# Patient Record
Sex: FEMALE | Race: WHITE | NOT HISPANIC OR LATINO | Employment: FULL TIME | URBAN - METROPOLITAN AREA
[De-identification: names, ages, dates, MRNs, and addresses within clinical notes are randomized per-mention and may not be internally consistent; named-entity substitution may affect disease eponyms.]

---

## 2017-08-25 ENCOUNTER — HOSPITAL ENCOUNTER (OUTPATIENT)
Dept: RADIOLOGY | Facility: HOSPITAL | Age: 32
Discharge: HOME | End: 2017-08-25
Payer: COMMERCIAL

## 2017-08-25 ENCOUNTER — ALLSCRIPTS OFFICE VISIT (OUTPATIENT)
Dept: OTHER | Facility: OTHER | Age: 32
End: 2017-08-25

## 2017-08-25 ENCOUNTER — LAB REQUISITION (OUTPATIENT)
Dept: LAB | Facility: HOSPITAL | Age: 32
End: 2017-08-25
Payer: COMMERCIAL

## 2017-08-25 DIAGNOSIS — N92.6 IRREGULAR MENSTRUATION: ICD-10-CM

## 2017-08-25 DIAGNOSIS — N94.6 DYSMENORRHEA: ICD-10-CM

## 2017-08-25 DIAGNOSIS — Z11.51 ENCOUNTER FOR SCREENING FOR HUMAN PAPILLOMAVIRUS (HPV): ICD-10-CM

## 2017-08-25 DIAGNOSIS — Z12.4 ENCOUNTER FOR SCREENING FOR MALIGNANT NEOPLASM OF CERVIX: ICD-10-CM

## 2017-08-25 LAB — HCG, QUALITATIVE (HISTORICAL): NEGATIVE

## 2017-08-25 PROCEDURE — 87591 N.GONORRHOEAE DNA AMP PROB: CPT | Performed by: NURSE PRACTITIONER

## 2017-08-25 PROCEDURE — 76856 US EXAM PELVIC COMPLETE: CPT

## 2017-08-25 PROCEDURE — 76830 TRANSVAGINAL US NON-OB: CPT

## 2017-08-25 PROCEDURE — 87624 HPV HI-RISK TYP POOLED RSLT: CPT | Performed by: NURSE PRACTITIONER

## 2017-08-25 PROCEDURE — G0145 SCR C/V CYTO,THINLAYER,RESCR: HCPCS | Performed by: NURSE PRACTITIONER

## 2017-08-25 PROCEDURE — 87491 CHLMYD TRACH DNA AMP PROBE: CPT | Performed by: NURSE PRACTITIONER

## 2017-08-28 ENCOUNTER — APPOINTMENT (OUTPATIENT)
Dept: LAB | Facility: HOSPITAL | Age: 32
End: 2017-08-28
Payer: COMMERCIAL

## 2017-08-28 ENCOUNTER — TRANSCRIBE ORDERS (OUTPATIENT)
Dept: LAB | Facility: HOSPITAL | Age: 32
End: 2017-08-28

## 2017-08-28 DIAGNOSIS — N92.6 IRREGULAR MENSTRUATION: ICD-10-CM

## 2017-08-28 DIAGNOSIS — N94.6 DYSMENORRHEA: ICD-10-CM

## 2017-08-28 LAB
CHLAMYDIA DNA CVX QL NAA+PROBE: NORMAL
ERYTHROCYTE [DISTWIDTH] IN BLOOD BY AUTOMATED COUNT: 12.9 % (ref 11.6–15.1)
HCT VFR BLD AUTO: 41.1 % (ref 34.8–46.1)
HGB BLD-MCNC: 13.7 G/DL (ref 11.5–15.4)
MCH RBC QN AUTO: 32 PG (ref 26.8–34.3)
MCHC RBC AUTO-ENTMCNC: 33.3 G/DL (ref 31.4–37.4)
MCV RBC AUTO: 96 FL (ref 82–98)
N GONORRHOEA DNA GENITAL QL NAA+PROBE: NORMAL
PLATELET # BLD AUTO: 260 THOUSANDS/UL (ref 149–390)
PMV BLD AUTO: 11.3 FL (ref 8.9–12.7)
RBC # BLD AUTO: 4.28 MILLION/UL (ref 3.81–5.12)
TSH SERPL DL<=0.05 MIU/L-ACNC: 3.27 UIU/ML (ref 0.36–3.74)
WBC # BLD AUTO: 9.15 THOUSAND/UL (ref 4.31–10.16)

## 2017-08-28 PROCEDURE — 84443 ASSAY THYROID STIM HORMONE: CPT

## 2017-08-28 PROCEDURE — 36415 COLL VENOUS BLD VENIPUNCTURE: CPT

## 2017-08-28 PROCEDURE — 85027 COMPLETE CBC AUTOMATED: CPT

## 2017-08-29 LAB — HPV RRNA GENITAL QL NAA+PROBE: NORMAL

## 2017-08-30 LAB
LAB AP GYN PRIMARY INTERPRETATION: NORMAL
Lab: NORMAL

## 2017-08-31 ENCOUNTER — GENERIC CONVERSION - ENCOUNTER (OUTPATIENT)
Dept: OTHER | Facility: OTHER | Age: 32
End: 2017-08-31

## 2017-09-01 ENCOUNTER — GENERIC CONVERSION - ENCOUNTER (OUTPATIENT)
Dept: OTHER | Facility: OTHER | Age: 32
End: 2017-09-01

## 2017-09-15 ENCOUNTER — GENERIC CONVERSION - ENCOUNTER (OUTPATIENT)
Dept: OTHER | Facility: OTHER | Age: 32
End: 2017-09-15

## 2017-12-27 ENCOUNTER — GENERIC CONVERSION - ENCOUNTER (OUTPATIENT)
Dept: OTHER | Facility: OTHER | Age: 32
End: 2017-12-27

## 2018-01-13 NOTE — MISCELLANEOUS
Message   Recorded as Task   Date: 08/31/2017 03:54 PM, Created By: Kwaku Hobbs   Task Name: Call Back   Assigned To: [facility],Team   Regarding Patient: Va German, Status: In Progress   Comment:    Stacey Son - 31 Aug 2017 3:54 PM     TASK CREATED  Caller: Self; Results Inquiry; [phone] (Home); [phone] (Work)  pt would like her bloodwork & us results done Amelie sl on 8/25, pls call [phone]   [facility] [place] - 31 Aug 2017 4:19 PM     TASK IN PROGRESS   ElviaBrooklyn suarez - 31 Aug 2017 4:23 PM     TASK EDITED  Spoke with pt - advised results needs to be verified first  Once they are she will be notified of results  Active Problems    1  Breast cyst (610 0) (N60 09)   2  Cervical cancer screening (V76 2) (Z12 4)   3  Dysmenorrhea (625 3) (N94 6)   4  Encounter for gynecological examination without abnormal finding (V72 31) (Z01 419)   5  Irregular menses (626 4) (N92 6)   6  Screening for HPV (human papillomavirus) (V73 81) (Z11 51)   7  Skin lesion (709 9) (L98 9)    Current Meds   1  Biotin CAPS Recorded   2  ZyrTEC Allergy 10 MG Oral Capsule Recorded    Allergies    1  Aleve TABS   2  Lyrica CAPS   3  Macrobid CAPS   4   Penicillins    Signatures   Electronically signed by : Mendel Patel, ; Aug 31 2017  4:23PM EST                       (Author)

## 2018-01-14 VITALS
DIASTOLIC BLOOD PRESSURE: 60 MMHG | SYSTOLIC BLOOD PRESSURE: 114 MMHG | BODY MASS INDEX: 28.77 KG/M2 | HEIGHT: 66 IN | WEIGHT: 179 LBS

## 2018-01-17 NOTE — MISCELLANEOUS
Message   Recorded as Task   Date: 09/01/2017 03:56 PM, Created By: Letty Salmon   Task Name: Result Follow Up   Assigned To: [facility],Team   Regarding Patient: Quinten Polo, Status: In Progress   Blake Clayton - 01 Sep 2017 3:56 PM     TASK CREATED  Work up performed for irreg menses   US and labs are WNL; please advise that pap is also WNL   I would advise active management to regulate cycles given the frequency of her bleeding   We discussed options at her office visit   She may f/u any time     Thanks   Brooklyn Gan - 01 Sep 2017 3:57 PM     TASK IN PROGRESS   Brooklyn Gan - 01 Sep 2017 4:03 PM     TASK EDITED  [facility]INT IS AWARE OF RESULTS  MADE AN APPOINTMENT TO DISCUSS ACTIVE MANAGEMENT  Active Problems    1  Breast cyst (610 0) (N60 09)   2  Cervical cancer screening (V76 2) (Z12 4)   3  Dysmenorrhea (625 3) (N94 6)   4  Encounter for gynecological examination without abnormal finding (V72 31) (Z01 419)   5  Irregular menses (626 4) (N92 6)   6  Screening for HPV (human papillomavirus) (V73 81) (Z11 51)   7  Skin lesion (709 9) (L98 9)    Current Meds   1  Biotin CAPS Recorded   2  ZyrTEC Allergy 10 MG Oral Capsule Recorded    Allergies    1  Aleve TABS   2  Lyrica CAPS   3  Macrobid CAPS   4   Penicillins    Signatures   Electronically signed by : Lidia Pacheco, ; Sep  1 2017  4:03PM EST                       (Author)

## 2018-01-22 VITALS
SYSTOLIC BLOOD PRESSURE: 130 MMHG | DIASTOLIC BLOOD PRESSURE: 82 MMHG | WEIGHT: 176 LBS | BODY MASS INDEX: 28.28 KG/M2 | HEIGHT: 66 IN

## 2018-01-23 NOTE — MISCELLANEOUS
Message   Recorded as Task   Date: 12/27/2017 10:26 AM, Created By: Soraida López   Task Name: Care Coordination   Assigned To: [facility],Team   Regarding Patient: Jamia Barcenas, Status: In Progress   Comment:    Maricruz Alcantar - 27 Dec 2017 10:26 AM     TASK CREATED  Caller: Self; Care Coordination; [phone] (Home); [phone] (Work)  pt call would like her blood work results and us to be fax over to her primary dr [facility] fax # [phone] and there # is [phone] pt's phone # [phone]  Brooklyn Gan - 27 Dec 2017 10:37 AM     TASK IN PROGRESS   Brooklyn Gan - 27 Dec 2017 10:46 AM     TASK EDITED  [facility] for pt to cb  Need to know when b/w was done  ext: [phone]   Brooklyn Gan - 27 Dec 2017 10:55 AM     TASK EDITED  Faxed labs to pt PCP  Active Problems    1  Breast cyst (610 0) (N60 09)   2  Dysmenorrhea (625 3) (N94 6)   3  Irregular menses (626 4) (N92 6)   4  Skin lesion (709 9) (L98 9)    Current Meds   1  Biotin CAPS Recorded   2  ZyrTEC Allergy 10 MG Oral Capsule Recorded    Allergies    1  Aleve TABS   2  Lyrica CAPS   3  Macrobid CAPS   4   Penicillins    Signatures   Electronically signed by : Rashad Charles, ; Dec 27 2017 10:55AM EST                       (Author)

## 2018-02-15 ENCOUNTER — OFFICE VISIT (OUTPATIENT)
Dept: OBGYN CLINIC | Facility: CLINIC | Age: 33
End: 2018-02-15
Payer: COMMERCIAL

## 2018-02-15 VITALS
DIASTOLIC BLOOD PRESSURE: 70 MMHG | BODY MASS INDEX: 27.64 KG/M2 | HEIGHT: 66 IN | SYSTOLIC BLOOD PRESSURE: 118 MMHG | WEIGHT: 172 LBS

## 2018-02-15 DIAGNOSIS — N94.6 DYSMENORRHEA: Primary | ICD-10-CM

## 2018-02-15 DIAGNOSIS — N92.0 MENORRHAGIA WITH REGULAR CYCLE: ICD-10-CM

## 2018-02-15 PROCEDURE — 99214 OFFICE O/P EST MOD 30 MIN: CPT | Performed by: NURSE PRACTITIONER

## 2018-02-15 RX ORDER — BIOTIN 10000 MCG
CAPSULE ORAL
COMMUNITY

## 2018-02-15 RX ORDER — DAPSONE 75 MG/G
GEL TOPICAL
COMMUNITY
Start: 2016-08-04 | End: 2021-08-18

## 2018-02-15 RX ORDER — EMOLLIENT COMBINATION NO.43
CREAM (GRAM) TOPICAL
Refills: 1 | COMMUNITY
Start: 2018-01-16 | End: 2019-11-19

## 2018-02-15 RX ORDER — EMOLLIENT COMBINATION NO.43
CREAM (GRAM) TOPICAL
COMMUNITY
Start: 2016-08-04

## 2018-02-15 NOTE — ASSESSMENT & PLAN NOTE
This patient presents to discuss longstanding h/o dysmenorrhea and menorrhagia  Work up was recently WNL  She has been counseled on management in the past and has difficulty deciding how she wishes to proceed  She is tearful with this discussion again today - emotional support was provided  We reviewed NSAIDS, hormonal meds or surgical consult to discuss exploratory lap for r/o endometriosis  She will consider  Written info given for Mirena IUD

## 2018-02-15 NOTE — PROGRESS NOTES
Assessment/Plan:    Dysmenorrhea  This patient presents to discuss longstanding h/o dysmenorrhea and menorrhagia  Work up was recently WNL  She has been counseled on management in the past and has difficulty deciding how she wishes to proceed  She is tearful with this discussion again today - emotional support was provided  We reviewed NSAIDS, hormonal meds or surgical consult to discuss exploratory lap for r/o endometriosis  She will consider  Written info given for Mirena IUD  Diagnoses and all orders for this visit:    Dysmenorrhea    Menorrhagia with regular cycle    Other orders  -     Dapsone (ACZONE) 7 5 % GEL; PEA SIZE TO FACE , NECK AND CHEST EVERY NIGHT AT BEDTIME  -     Antiseborrheic Products, Misc  (PROMISEB) CREA; APPLY TO FACE TWICE A DAY AS NEEDED  -     Biotin 10 MG CAPS; Take by mouth  -     Antiseborrheic Products, Misc  (PROMISEB) CREA; TO FACE 2 TIMES A DAY AS NEEDED  -     Cetirizine HCl (ZYRTEC ALLERGY) 10 MG CAPS; Take by mouth          Subjective:      Patient ID: Emerick Cogan is a 28 y o  female  This patient presents to discuss painful and heavy menses  Cycles are currently reg  Spots for about 7d then has 1 day of heavy bleeding  She has cramping and low back pain on that one day of heavy bleeding  Midol is somewhat effective  She has been recently dx'd with hemorrhoids on colonoscopy - expectant management was recommended by colorectal  She bleeds more rectally when she is menstruating  She feels very stressed about making a decision re: management options  Tearful with this discussion  The following portions of the patient's history were reviewed and updated as appropriate: allergies, current medications, past family history, past medical history, past social history, past surgical history and problem list     Review of Systems   Constitutional: Negative  HENT: Negative  Eyes: Negative  Respiratory: Negative  Cardiovascular: Negative  Gastrointestinal: Positive for blood in stool  Endocrine: Negative  Genitourinary: Positive for menstrual problem  Negative for dysuria, frequency, pelvic pain and vaginal discharge  Musculoskeletal: Negative  Skin: Negative  Allergic/Immunologic: Negative  Neurological: Negative  Hematological: Negative  Psychiatric/Behavioral: Negative  Objective:    Vitals:    02/15/18 1555   BP: 118/70        Physical Exam   Constitutional: She is oriented to person, place, and time  She appears well-developed and well-nourished  HENT:   Head: Normocephalic  Eyes: Pupils are equal, round, and reactive to light  Neck: Normal range of motion  Pulmonary/Chest: Effort normal    Neurological: She is alert and oriented to person, place, and time  Psychiatric: She has a normal mood and affect   Her behavior is normal  Judgment and thought content normal

## 2019-09-10 ENCOUNTER — ANNUAL EXAM (OUTPATIENT)
Dept: OBGYN CLINIC | Facility: CLINIC | Age: 34
End: 2019-09-10
Payer: COMMERCIAL

## 2019-09-10 VITALS — BODY MASS INDEX: 27.57 KG/M2 | DIASTOLIC BLOOD PRESSURE: 62 MMHG | SYSTOLIC BLOOD PRESSURE: 108 MMHG | WEIGHT: 170.8 LBS

## 2019-09-10 DIAGNOSIS — Z01.419 ENCOUNTER FOR GYNECOLOGICAL EXAMINATION (GENERAL) (ROUTINE) WITHOUT ABNORMAL FINDINGS: Primary | ICD-10-CM

## 2019-09-10 DIAGNOSIS — N92.6 IRREGULAR MENSES: ICD-10-CM

## 2019-09-10 DIAGNOSIS — K64.8 INTERNAL HEMORRHOIDS: ICD-10-CM

## 2019-09-10 PROCEDURE — S0612 ANNUAL GYNECOLOGICAL EXAMINA: HCPCS | Performed by: NURSE PRACTITIONER

## 2019-09-10 RX ORDER — MOMETASONE FUROATE 1 MG/G
CREAM TOPICAL
COMMUNITY
Start: 2018-10-23 | End: 2020-09-14

## 2019-09-10 NOTE — ASSESSMENT & PLAN NOTE
Ongoing unpredictable cycles  2017 work up was benign and the patient declines medical management  Offered preop with MD/DO to discuss benefits of lap for r/o endometriosis and the patient declines  She is aware she may schedule this any time; she requests [place] or [place]  F/u PRN if medical or surgical management is desired, or when actively TTC

## 2019-09-10 NOTE — ASSESSMENT & PLAN NOTE
Normal findings on routine gyn exam  Advised monthly SBE, annual CBE and baseline screening mammo at age 36  Reviewed ASCCP guidelines and recommended pap with cotesting q3 yrs for this low risk patient; this is noted to be up to date  STI testing was offered and the patient declines; she reports low risk  The patient declines contra counseling as she is not currently sexually active  Diet/activity recommendations reviewed  RTO in one year or sooner PRN

## 2019-09-10 NOTE — ASSESSMENT & PLAN NOTE
Dx'd on colonoscopy  Findings otherwise WNL  She has been counseled on constipation management with cyclical changes

## 2019-09-12 NOTE — PROGRESS NOTES
Assessment/Plan:    Irregular menses  Ongoing unpredictable cycles  2017 work up was benign and the patient declines medical management  Offered preop with MD/DO to discuss benefits of lap for r/o endometriosis and the patient declines  She is aware she may schedule this any time; she requests [place] or [place]  F/u PRN if medical or surgical management is desired, or when actively TTC  Encounter for gynecological examination (general) (routine) without abnormal findings  Normal findings on routine gyn exam  Advised monthly SBE, annual CBE and baseline screening mammo at age 36  Reviewed ASCCP guidelines and recommended pap with cotesting q3 yrs for this low risk patient; this is noted to be up to date  STI testing was offered and the patient declines; she reports low risk  The patient declines contra counseling as she is not currently sexually active  Diet/activity recommendations reviewed  RTO in one year or sooner PRN  Internal hemorrhoids  Dx'd on colonoscopy  Findings otherwise WNL  She has been counseled on constipation management with cyclical changes  Diagnoses and all orders for this visit:    Encounter for gynecological examination (general) (routine) without abnormal findings    Irregular menses    Internal hemorrhoids    Other orders  -     mometasone (ELOCON) 0 1 % cream; Apply once daily to affected areas of skin          Subjective:      Patient ID: Miley Garcia is a 29 y o  female  This patient presents for routine annual gyn exam    Medically stable  Colonoscopy with internal hemorrhoids    Irreg periods have continued intermittently  Work up was benign and she elects to continue expectant management  Not sexually active  Relationship is good  Thinking about engagement soon   She denies acute gyn complaints  She denies pelvic pain, breast concerns, abnormal discharge, bowel/bladder dysfunction, depression/anxiety  Monogamous  She denies STI concerns             The following portions of the patient's history were reviewed and updated as appropriate: allergies, current medications, past family history, past medical history, past social history, past surgical history and problem list     Review of Systems   Constitutional: Negative  Respiratory: Negative  Cardiovascular: Negative  Gastrointestinal: Negative  Genitourinary: Positive for menstrual problem  Negative for dysuria, frequency, pelvic pain, vaginal bleeding and vaginal discharge  Musculoskeletal: Negative  Skin: Negative  Neurological: Negative  Psychiatric/Behavioral: Negative  Objective:      /62 (BP Location: Right arm, Patient Position: Sitting, Cuff Size: Standard)   Wt 77 5 kg (170 lb 12 8 oz)   LMP 08/31/2019 (Exact Date)   BMI 27 57 kg/m²          Physical Exam   Constitutional: She is oriented to person, place, and time  She appears well-developed and well-nourished  HENT:   Head: Normocephalic and atraumatic  Eyes: Pupils are equal, round, and reactive to light  EOM are normal    Neck: Normal range of motion  Neck supple  No thyromegaly present  Cardiovascular: Normal rate, regular rhythm and normal heart sounds  Pulmonary/Chest: Effort normal and breath sounds normal  No respiratory distress  She has no wheezes  She has no rales  She exhibits no mass, no tenderness and no deformity  Right breast exhibits no inverted nipple, no mass, no nipple discharge, no skin change and no tenderness  Left breast exhibits no inverted nipple, no mass, no nipple discharge, no skin change and no tenderness  No breast tenderness or discharge  Breasts are symmetrical    Abdominal: Soft  She exhibits no distension and no mass  There is no splenomegaly or hepatomegaly  There is no tenderness  There is no rebound and no guarding  Genitourinary: Rectum normal, vagina normal and uterus normal  No breast tenderness or discharge  No labial fusion   There is no rash, tenderness, lesion or injury on the right labia  There is no rash, tenderness, lesion or injury on the left labia  Cervix exhibits no motion tenderness, no discharge and no friability  Right adnexum displays no mass, no tenderness and no fullness  Left adnexum displays no mass, no tenderness and no fullness  No erythema, tenderness or bleeding in the vagina  No foreign body in the vagina  No vaginal discharge found  Musculoskeletal: Normal range of motion  Lymphadenopathy:     She has no cervical adenopathy  She has no axillary adenopathy  Neurological: She is alert and oriented to person, place, and time  No cranial nerve deficit  Skin: Skin is warm and dry  No rash noted  No cyanosis  Nails show no clubbing  Psychiatric: She has a normal mood and affect   Her speech is normal and behavior is normal  Judgment and thought content normal  Cognition and memory are normal

## 2019-11-19 PROBLEM — J30.1 CHRONIC SEASONAL ALLERGIC RHINITIS DUE TO POLLEN: Status: ACTIVE | Noted: 2019-11-19

## 2019-11-19 PROBLEM — N94.6 DYSMENORRHEA: Status: ACTIVE | Noted: 2017-08-25

## 2019-11-19 PROBLEM — J45.990 EXERCISE INDUCED BRONCHOSPASM: Status: ACTIVE | Noted: 2019-11-19

## 2019-11-19 PROBLEM — L20.84 INTRINSIC ATOPIC DERMATITIS: Status: ACTIVE | Noted: 2019-11-19

## 2019-11-19 PROBLEM — L30.9 MILD ECZEMA: Status: ACTIVE | Noted: 2019-07-23

## 2019-11-19 PROBLEM — J30.9 ALLERGIC RHINITIS DUE TO ALLERGEN: Status: ACTIVE | Noted: 2019-07-23

## 2019-11-19 PROBLEM — H10.13 ALLERGIC CONJUNCTIVITIS OF BOTH EYES: Status: ACTIVE | Noted: 2019-11-19

## 2020-10-15 ENCOUNTER — ANNUAL EXAM (OUTPATIENT)
Dept: OBGYN CLINIC | Facility: CLINIC | Age: 35
End: 2020-10-15
Payer: COMMERCIAL

## 2020-10-15 VITALS
DIASTOLIC BLOOD PRESSURE: 64 MMHG | HEIGHT: 66 IN | SYSTOLIC BLOOD PRESSURE: 122 MMHG | TEMPERATURE: 97.9 F | WEIGHT: 171.8 LBS | BODY MASS INDEX: 27.61 KG/M2

## 2020-10-15 DIAGNOSIS — N92.6 IRREGULAR MENSES: ICD-10-CM

## 2020-10-15 DIAGNOSIS — N76.0 BV (BACTERIAL VAGINOSIS): ICD-10-CM

## 2020-10-15 DIAGNOSIS — Z11.51 SCREENING FOR HUMAN PAPILLOMAVIRUS (HPV): ICD-10-CM

## 2020-10-15 DIAGNOSIS — Z01.419 ENCOUNTER FOR GYNECOLOGICAL EXAMINATION (GENERAL) (ROUTINE) WITHOUT ABNORMAL FINDINGS: Primary | ICD-10-CM

## 2020-10-15 DIAGNOSIS — B96.89 BV (BACTERIAL VAGINOSIS): ICD-10-CM

## 2020-10-15 DIAGNOSIS — N94.6 DYSMENORRHEA: ICD-10-CM

## 2020-10-15 PROCEDURE — S0612 ANNUAL GYNECOLOGICAL EXAMINA: HCPCS | Performed by: OBSTETRICS & GYNECOLOGY

## 2020-10-15 RX ORDER — METRONIDAZOLE 500 MG/1
500 TABLET ORAL EVERY 12 HOURS SCHEDULED
Qty: 14 TABLET | Refills: 0 | Status: SHIPPED | OUTPATIENT
Start: 2020-10-15 | End: 2020-10-22

## 2020-10-21 ENCOUNTER — TELEPHONE (OUTPATIENT)
Dept: OBGYN CLINIC | Facility: CLINIC | Age: 35
End: 2020-10-21

## 2020-11-05 ENCOUNTER — TELEPHONE (OUTPATIENT)
Dept: OBGYN CLINIC | Facility: CLINIC | Age: 35
End: 2020-11-05

## 2020-11-10 ENCOUNTER — TELEPHONE (OUTPATIENT)
Dept: OBGYN CLINIC | Facility: CLINIC | Age: 35
End: 2020-11-10

## 2020-11-17 ENCOUNTER — OFFICE VISIT (OUTPATIENT)
Dept: OBGYN CLINIC | Facility: CLINIC | Age: 35
End: 2020-11-17
Payer: COMMERCIAL

## 2020-11-17 VITALS
BODY MASS INDEX: 28.25 KG/M2 | TEMPERATURE: 96.9 F | DIASTOLIC BLOOD PRESSURE: 62 MMHG | WEIGHT: 175 LBS | SYSTOLIC BLOOD PRESSURE: 102 MMHG

## 2020-11-17 DIAGNOSIS — Z11.51 SCREENING FOR HUMAN PAPILLOMAVIRUS (HPV): Primary | ICD-10-CM

## 2020-11-17 DIAGNOSIS — Z01.419 ENCOUNTER FOR GYNECOLOGICAL EXAMINATION (GENERAL) (ROUTINE) WITHOUT ABNORMAL FINDINGS: ICD-10-CM

## 2020-11-17 PROCEDURE — S0612 ANNUAL GYNECOLOGICAL EXAMINA: HCPCS | Performed by: OBSTETRICS & GYNECOLOGY

## 2020-11-17 PROCEDURE — G0145 SCR C/V CYTO,THINLAYER,RESCR: HCPCS | Performed by: OBSTETRICS & GYNECOLOGY

## 2020-11-17 PROCEDURE — 87624 HPV HI-RISK TYP POOLED RSLT: CPT | Performed by: OBSTETRICS & GYNECOLOGY

## 2020-11-20 LAB
HPV HR 12 DNA CVX QL NAA+PROBE: NEGATIVE
HPV16 DNA CVX QL NAA+PROBE: NEGATIVE
HPV18 DNA CVX QL NAA+PROBE: NEGATIVE

## 2020-11-23 LAB
LAB AP GYN PRIMARY INTERPRETATION: NORMAL
LAB AP LMP: NORMAL
Lab: NORMAL

## 2020-11-24 ENCOUNTER — TELEPHONE (OUTPATIENT)
Dept: OBGYN CLINIC | Facility: CLINIC | Age: 35
End: 2020-11-24

## 2020-12-07 PROBLEM — D72.19 EOSINOPHILIC LEUKOCYTOSIS: Status: ACTIVE | Noted: 2020-12-01

## 2021-03-30 DIAGNOSIS — Z23 ENCOUNTER FOR IMMUNIZATION: ICD-10-CM

## 2021-03-31 ENCOUNTER — OFFICE VISIT (OUTPATIENT)
Dept: GASTROENTEROLOGY | Facility: AMBULARY SURGERY CENTER | Age: 36
End: 2021-03-31
Payer: COMMERCIAL

## 2021-03-31 VITALS — WEIGHT: 174 LBS | RESPIRATION RATE: 16 BRPM | HEIGHT: 66 IN | BODY MASS INDEX: 27.97 KG/M2

## 2021-03-31 DIAGNOSIS — D50.8 OTHER IRON DEFICIENCY ANEMIA: Primary | ICD-10-CM

## 2021-03-31 DIAGNOSIS — R19.4 CHANGE IN BOWEL HABITS: ICD-10-CM

## 2021-03-31 DIAGNOSIS — R10.13 DYSPEPSIA: ICD-10-CM

## 2021-03-31 PROBLEM — D64.9 ABSOLUTE ANEMIA: Status: ACTIVE | Noted: 2021-03-31

## 2021-03-31 PROCEDURE — 99244 OFF/OP CNSLTJ NEW/EST MOD 40: CPT | Performed by: INTERNAL MEDICINE

## 2021-03-31 RX ORDER — SODIUM, POTASSIUM,MAG SULFATES 17.5-3.13G
2 SOLUTION, RECONSTITUTED, ORAL ORAL SEE ADMIN INSTRUCTIONS
Qty: 2 BOTTLE | Refills: 0 | Status: SHIPPED | OUTPATIENT
Start: 2021-03-31 | End: 2021-05-14 | Stop reason: ALTCHOICE

## 2021-03-31 NOTE — ASSESSMENT & PLAN NOTE
Iron deficiency anemia with improvement on iron supplements  No evidence of any significant GI bleeding  Rule out chronic occult GI blood loss  Also rule out iron malabsorption     -  Check stool for FIT     -Schedule for both EGD and colonoscopy  -High-fiber diet     -Patient was given instructions about the colonoscopy prep     -Patient was explained about  the risks and benefits of the procedure  Risks including but not limited to bleeding, infection, perforation were explained in detail  Also explained about less than 100% sensitivity with the exam and other alternatives

## 2021-03-31 NOTE — ASSESSMENT & PLAN NOTE
Symptoms of bloating and gassiness along with iron deficiency-rule out celiac disease or any other malabsorption syndromes      -  Check celiac disease panel and food allergy profile    - EGD with biopsies

## 2021-03-31 NOTE — PROGRESS NOTES
Consultation - [facility]) [facility]  Ilsa Records 1985 female         Chief Complaint:   anemia    HPI:   27-year-old female with history of asthma was noted to be anemic with hemoglobin for 10 6 in December  Workup showed iron deficiency with ferritin of 4 and saturations 7 percent  She has been taking iron supplements with improvement of hemoglobin to 14 recently  She reports having symptoms of bloating, gassiness and alternating diarrhea or constipation which are worse during menstrual periods  She denies any blood or mucus in the stool  Denies any darker stool  She reports having menstrual periods,  heavy initially and then spotting lasting for 10 days  Good appetite, no recent weight loss  Denies abdominal pain, nausea or vomiting  Denies any heartburn acid reflux  Denies any difficulty swallowing  She had colonoscopy in 2018 for rectal bleeding and was told about internal hemorrhoids  REVIEW OF SYSTEMS: Review of Systems   Constitutional: Negative for activity change, appetite change, chills, diaphoresis, fatigue and unexpected weight change  HENT: Negative for ear discharge, ear pain, facial swelling, hearing loss, nosebleeds, sore throat, tinnitus and voice change  Eyes: Negative for pain, discharge, redness, itching and visual disturbance  Respiratory: Negative for apnea, cough, chest tightness, shortness of breath and wheezing  Cardiovascular: Negative for chest pain and palpitations  Gastrointestinal:        As noted in HPI   Endocrine: Negative for cold intolerance, heat intolerance and polyuria  Genitourinary: Positive for frequency  Negative for difficulty urinating, flank pain and urgency  Musculoskeletal: Negative for back pain, gait problem and joint swelling  Skin: Negative for rash and wound  Neurological: Positive for numbness  Negative for dizziness, tremors, seizures, speech difficulty and light-headedness     Hematological: Negative for adenopathy  Does not bruise/bleed easily  Psychiatric/Behavioral: Negative for agitation, behavioral problems and confusion  The patient is not nervous/anxious  Past Medical History:   Diagnosis Date    Asthma     Blood type A+     Breast lump     last assessed: 11/20/2013    GERD (gastroesophageal reflux disease)     Internal hemorrhoid     Low iron     Lumbar herniated disc     Thoracic outlet syndrome       Past Surgical History:   Procedure Laterality Date    COLONOSCOPY      DENTAL SURGERY      implants     TOOTH EXTRACTION      WISDOM TOOTH EXTRACTION       Social History     Socioeconomic History    Marital status: Single     Spouse name: Not on file    Number of children: 0    Years of education: Not on file    Highest education level: Not on file   Occupational History    Occupation: Behavior analyst     Comment: [employer]   Social Needs    Financial resource strain: Not on file    Food insecurity     Worry: Not on file     Inability: Not on file   [employer] needs     Medical: Not on file     Non-medical: Not on file   Tobacco Use    Smoking status: Never Smoker    Smokeless tobacco: Never Used   Substance and Sexual Activity    Alcohol use:  Yes     Alcohol/week: 1 0 - 2 0 standard drinks     Types: 1 - 2 Glasses of wine per week     Comment: socially    Drug use: Never    Sexual activity: Not Currently     Partners: Male     Birth control/protection: None   Lifestyle    Physical activity     Days per week: 4 days     Minutes per session: 60 min    Stress: Not on file   Relationships    Social connections     Talks on phone: Not on file     Gets together: Not on file     Attends Holiness service: Not on file     Active member of club or organization: Not on file     Attends meetings of clubs or organizations: Not on file     Relationship status: Not on file    Intimate partner violence     Fear of current or ex partner: Not on file     Emotionally abused: Not on file     Physically abused: Not on file     Forced sexual activity: Not on file   Other Topics Concern    Not on file   Social History Narrative    Caffeine use    Daily caffeine consumption, 2-3 servings a day    Exercises frequency (times/week)        House was built in 1910    Heating system Radiator and baseboard    Window AC    Bedroom is Carpeted    No Humidifier or Air Purifier     Basement is unfinished, damp, no dehumidifier,  Has musty smell, No visible mold  Lives with parents            Pets - 3 dogs            Caffeine - Coffee 2 x16 oz cups per day    Soda occasionally      Chocolate daily small amount       Family History   Problem Relation Age of Onset    Thyroid disease Mother     Other Mother         Tubes in ears    Stroke Father     Other Father         Bee sting allergy    Hyperlipidemia Father     Breast cancer Paternal Grandmother     Diabetes Paternal Grandmother     Breast cancer Family         acinar cell    Diabetes Family         mellitus    Thyroid disease Family         thyroid disorder    No Known Problems Brother      Naproxen, Cat hair extract, Dog epithelium, Dog epithelium allergy skin test, Pollen extract, Ragwitek [short ragweed pollen ext], Lyrica [pregabalin], and Nitrofurantoin  Current Outpatient Medications   Medication Sig Dispense Refill    albuterol (PROVENTIL HFA,VENTOLIN HFA) 90 mcg/act inhaler Inhale 2 puffs every 6 (six) hours as needed     [employer]   (PROMISE) CREA TO FACE 2 TIMES A DAY AS NEEDED      azelastine (ASTELIN) 0 1 % nasal spray 1 spray into each nostril 2 (two) times a day Use in each nostril as directed 1 Bottle 12    Biotin 10 MG CAPS Take by mouth      BLACK ELDERBERRY PO Take by mouth      Cetirizine HCl (ZYRTEC ALLERGY) 10 MG CAPS Take by mouth      Dapsone (ACZONE) 7 5 % GEL PEA SIZE TO FACE , NECK AND CHEST EVERY NIGHT AT BEDTIME      ferrous sulfate 325 (65 Fe) mg tablet Take 325 mg by mouth daily with breakfast      fluticasone-umeclidinium-vilanterol (TRELEGY) 100-62 5-25 MCG/INH inhaler Inhale 1 puff daily Rinse mouth after use  1 Inhaler 12    Probiotic Product (PROBIOTIC DAILY PO) Take by mouth      Na Sulfate-K Sulfate-Mg Sulf (Suprep Bowel Prep Kit) 17 5-3 13-1 6 GM/177ML SOLN Take 2 Bottles by mouth see administration instructions Please follow the instructions from the office 2 Bottle 0     No current facility-administered medications for this visit  Resp  rate 16, height 5' 6" (1 676 m), weight 78 9 kg (174 lb)  PHYSICAL EXAM: Physical Exam  Constitutional:       Appearance: She is well-developed  HENT:      Head: Normocephalic and atraumatic  Nose: Nose normal    Eyes:      General: No scleral icterus  Right eye: No discharge  Left eye: No discharge  Conjunctiva/sclera: Conjunctivae normal    Neck:      Musculoskeletal: Neck supple  Thyroid: No thyromegaly  Vascular: No JVD  Trachea: No tracheal deviation  Cardiovascular:      Rate and Rhythm: Normal rate and regular rhythm  Heart sounds: Normal heart sounds  No murmur  No friction rub  No gallop  Pulmonary:      Effort: Pulmonary effort is normal  No respiratory distress  Breath sounds: Normal breath sounds  No wheezing or rales  Chest:      Chest wall: No tenderness  Abdominal:      General: Bowel sounds are normal  There is no distension  Palpations: Abdomen is soft  There is no mass  Tenderness: There is no abdominal tenderness  There is no guarding or rebound  Hernia: No hernia is present  Musculoskeletal:         General: No tenderness or deformity  Lymphadenopathy:      Cervical: No cervical adenopathy  Skin:     General: Skin is warm and dry  Findings: No erythema or rash  Neurological:      Mental Status: She is alert and oriented to person, place, and time  Psychiatric:         Behavior: Behavior normal          Thought Content:  Thought content normal           Lab Results   Component Value Date    WBC 9 15 08/28/2017    HGB 13 7 08/28/2017    HCT 41 1 08/28/2017    MCV 96 08/28/2017     08/28/2017     Lab Results   Component Value Date    GLUCOSE 86 01/23/2014    CALCIUM 9 2 01/23/2014     (L) 01/23/2014    K 3 7 01/23/2014    CO2 27 01/23/2014    CL 98 (L) 01/23/2014    BUN 8 01/23/2014    CREATININE 0 74 01/23/2014     Lab Results   Component Value Date    ALT 19 01/23/2014    AST 14 01/23/2014    ALKPHOS 77 01/23/2014    BILITOT 0 28 01/23/2014     No results found for: INR, PROTIME    Us Pelvis Complete W Transvaginal    Result Date: 8/28/2017  Impression:  Unremarkable pelvic ultrasound  Workstation performed: [id number]       ASSESSMENT & PLAN:    Absolute anemia    Iron deficiency anemia with improvement on iron supplements  No evidence of any significant GI bleeding  Rule out chronic occult GI blood loss  Also rule out iron malabsorption     -  Check stool for FIT     -Schedule for both EGD and colonoscopy  -High-fiber diet     -Patient was given instructions about the colonoscopy prep     -Patient was explained about  the risks and benefits of the procedure  Risks including but not limited to bleeding, infection, perforation were explained in detail  Also explained about less than 100% sensitivity with the exam and other alternatives  Dyspepsia   Symptoms of bloating and gassiness along with iron deficiency-rule out celiac disease or any other malabsorption syndromes  -  Check celiac disease panel and food allergy profile    - EGD with biopsies    Change in bowel habits   Possible functional symptoms from IBS      -  EGD and colonoscopy

## 2021-04-02 LAB
ALMOND IGE QN: <0.1 KU/L
CASHEW NUT IGE QN: <0.1 KU/L
CODFISH IGE QN: <0.1 KU/L
DEPRECATED ALMOND IGE RAST QL: 0
DEPRECATED CASHEW NUT IGE RAST QL: 0
DEPRECATED CODFISH IGE RAST QL: 0
DEPRECATED EGG WHITE IGE RAST QL: 0
DEPRECATED HAZELNUT IGE RAST QL: 0
DEPRECATED MILK IGE RAST QL: 0
DEPRECATED PEANUT IGE RAST QL: 0
DEPRECATED SALMON IGE RAST QL: 0
DEPRECATED SCALLOP IGE RAST QL: 0
DEPRECATED SESAME SEED IGE RAST QL: 0
DEPRECATED SHRIMP IGE RAST QL: 0
DEPRECATED SOYBEAN IGE RAST QL: 0
DEPRECATED TUNA IGE RAST QL: 0
DEPRECATED WALNUT IGE RAST QL: 0
DEPRECATED WHEAT IGE RAST QL: 0
EGG WHITE IGE QN: <0.1 KU/L
HAZELNUT IGE QN: <0.1 KU/L
IGA SERPL-MCNC: 257 MG/DL (ref 47–310)
MILK IGE QN: <0.1 KU/L
PEANUT IGE QN: <0.1 KU/L
SALMON IGE QN: <0.1 KU/L
SCALLOP IGE QN: <0.1 KU/L
SESAME SEED IGE QN: <0.1 KU/L
SHRIMP IGE QN: <0.1 KU/L
SOYBEAN IGE QN: <0.1 KU/L
TTG IGA SER-ACNC: 1 U/ML
TUNA IGE QN: <0.1 KU/L
WALNUT IGE QN: <0.1 KU/L
WHEAT IGE QN: <0.1 KU/L

## 2021-04-28 ENCOUNTER — TELEPHONE (OUTPATIENT)
Dept: GASTROENTEROLOGY | Facility: AMBULARY SURGERY CENTER | Age: 36
End: 2021-04-28

## 2021-04-28 NOTE — TELEPHONE ENCOUNTER
----- Message from Katharine García MD sent at 4/27/2021  4:59 PM EDT -----  Stool test came back as negative

## 2021-04-29 ENCOUNTER — ANESTHESIA EVENT (OUTPATIENT)
Dept: GASTROENTEROLOGY | Facility: AMBULARY SURGERY CENTER | Age: 36
End: 2021-04-29

## 2021-05-14 ENCOUNTER — ANESTHESIA (OUTPATIENT)
Dept: GASTROENTEROLOGY | Facility: AMBULARY SURGERY CENTER | Age: 36
End: 2021-05-14

## 2021-05-14 ENCOUNTER — HOSPITAL ENCOUNTER (OUTPATIENT)
Dept: GASTROENTEROLOGY | Facility: AMBULARY SURGERY CENTER | Age: 36
Setting detail: OUTPATIENT SURGERY
Discharge: HOME | End: 2021-05-14
Attending: INTERNAL MEDICINE
Payer: COMMERCIAL

## 2021-05-14 VITALS
OXYGEN SATURATION: 99 % | TEMPERATURE: 97.6 F | BODY MASS INDEX: 26.68 KG/M2 | HEIGHT: 67 IN | RESPIRATION RATE: 15 BRPM | HEART RATE: 58 BPM | SYSTOLIC BLOOD PRESSURE: 102 MMHG | DIASTOLIC BLOOD PRESSURE: 53 MMHG | WEIGHT: 170 LBS

## 2021-05-14 DIAGNOSIS — R19.4 CHANGE IN BOWEL HABITS: ICD-10-CM

## 2021-05-14 DIAGNOSIS — D50.8 OTHER IRON DEFICIENCY ANEMIA: ICD-10-CM

## 2021-05-14 DIAGNOSIS — K62.89 RECTAL MASS: ICD-10-CM

## 2021-05-14 DIAGNOSIS — K62.9 RECTAL LESION: Primary | ICD-10-CM

## 2021-05-14 DIAGNOSIS — R10.13 DYSPEPSIA: ICD-10-CM

## 2021-05-14 LAB
EXT PREGNANCY TEST URINE: NEGATIVE
EXT. CONTROL: NORMAL

## 2021-05-14 PROCEDURE — 81025 URINE PREGNANCY TEST: CPT | Performed by: INTERNAL MEDICINE

## 2021-05-14 PROCEDURE — 43239 EGD BIOPSY SINGLE/MULTIPLE: CPT | Performed by: INTERNAL MEDICINE

## 2021-05-14 PROCEDURE — 88305 TISSUE EXAM BY PATHOLOGIST: CPT | Performed by: PATHOLOGY

## 2021-05-14 PROCEDURE — 45380 COLONOSCOPY AND BIOPSY: CPT | Performed by: INTERNAL MEDICINE

## 2021-05-14 RX ORDER — GLYCOPYRROLATE 0.2 MG/ML
INJECTION INTRAMUSCULAR; INTRAVENOUS AS NEEDED
Status: DISCONTINUED | OUTPATIENT
Start: 2021-05-14 | End: 2021-05-14

## 2021-05-14 RX ORDER — SODIUM CHLORIDE 9 MG/ML
50 INJECTION, SOLUTION INTRAVENOUS CONTINUOUS
Status: DISCONTINUED | OUTPATIENT
Start: 2021-05-14 | End: 2021-05-18 | Stop reason: HOSPADM

## 2021-05-14 RX ORDER — FENTANYL CITRATE 50 UG/ML
INJECTION, SOLUTION INTRAMUSCULAR; INTRAVENOUS AS NEEDED
Status: DISCONTINUED | OUTPATIENT
Start: 2021-05-14 | End: 2021-05-14

## 2021-05-14 RX ORDER — LIDOCAINE HYDROCHLORIDE 20 MG/ML
INJECTION, SOLUTION EPIDURAL; INFILTRATION; INTRACAUDAL; PERINEURAL AS NEEDED
Status: DISCONTINUED | OUTPATIENT
Start: 2021-05-14 | End: 2021-05-14

## 2021-05-14 RX ORDER — PROPOFOL 10 MG/ML
INJECTION, EMULSION INTRAVENOUS AS NEEDED
Status: DISCONTINUED | OUTPATIENT
Start: 2021-05-14 | End: 2021-05-14

## 2021-05-14 RX ADMIN — PROPOFOL 30 MG: 10 INJECTION, EMULSION INTRAVENOUS at 08:45

## 2021-05-14 RX ADMIN — SODIUM CHLORIDE: 0.9 INJECTION, SOLUTION INTRAVENOUS at 08:14

## 2021-05-14 RX ADMIN — PROPOFOL 30 MG: 10 INJECTION, EMULSION INTRAVENOUS at 08:30

## 2021-05-14 RX ADMIN — PROPOFOL 30 MG: 10 INJECTION, EMULSION INTRAVENOUS at 08:35

## 2021-05-14 RX ADMIN — PROPOFOL 40 MG: 10 INJECTION, EMULSION INTRAVENOUS at 08:22

## 2021-05-14 RX ADMIN — PROPOFOL 40 MG: 10 INJECTION, EMULSION INTRAVENOUS at 08:42

## 2021-05-14 RX ADMIN — PROPOFOL 30 MG: 10 INJECTION, EMULSION INTRAVENOUS at 08:48

## 2021-05-14 RX ADMIN — LIDOCAINE HYDROCHLORIDE 3 ML: 20 INJECTION, SOLUTION EPIDURAL; INFILTRATION; INTRACAUDAL; PERINEURAL at 08:18

## 2021-05-14 RX ADMIN — PROPOFOL 30 MG: 10 INJECTION, EMULSION INTRAVENOUS at 08:19

## 2021-05-14 RX ADMIN — PROPOFOL 30 MG: 10 INJECTION, EMULSION INTRAVENOUS at 08:32

## 2021-05-14 RX ADMIN — PROPOFOL 40 MG: 10 INJECTION, EMULSION INTRAVENOUS at 08:38

## 2021-05-14 RX ADMIN — PROPOFOL 30 MG: 10 INJECTION, EMULSION INTRAVENOUS at 08:28

## 2021-05-14 RX ADMIN — PROPOFOL 100 MG: 10 INJECTION, EMULSION INTRAVENOUS at 08:18

## 2021-05-14 RX ADMIN — PROPOFOL 30 MG: 10 INJECTION, EMULSION INTRAVENOUS at 08:40

## 2021-05-14 RX ADMIN — PROPOFOL 40 MG: 10 INJECTION, EMULSION INTRAVENOUS at 08:25

## 2021-05-14 RX ADMIN — GLYCOPYRROLATE 0.1 MG: 0.2 INJECTION, SOLUTION INTRAMUSCULAR; INTRAVENOUS at 08:16

## 2021-05-14 RX ADMIN — FENTANYL CITRATE 25 MCG: 50 INJECTION, SOLUTION INTRAMUSCULAR; INTRAVENOUS at 08:18

## 2021-05-14 NOTE — DISCHARGE INSTRUCTIONS

## 2021-05-14 NOTE — ANESTHESIA PREPROCEDURE EVALUATION
Procedure:  COLONOSCOPY  EGD    Relevant Problems   GI/HEPATIC   (+) Esophageal reflux      HEMATOLOGY   (+) Absolute anemia      MUSCULOSKELETAL   (+) Exercise induced bronchospasm      NEURO/PSYCH   (+) Generalized anxiety disorder      PULMONARY   (+) Mild intermittent asthma without complication        Physical Exam    Airway    Mallampati score: II  TM Distance: >3 FB  Neck ROM: full     Dental   Comment: In the process of getting implant ,     Cardiovascular  Cardiovascular exam normal    Pulmonary  Pulmonary exam normal     Other Findings        Anesthesia Plan  ASA Score- 2     Anesthesia Type- IV sedation with anesthesia with ASA Monitors  Additional Monitors:   Airway Plan:           Plan Factors-Exercise tolerance (METS): >4 METS  Chart reviewed  Existing labs reviewed  Patient is not a current smoker  Patient not instructed to abstain from smoking on day of procedure  Patient did not smoke on day of surgery  Induction-     Postoperative Plan-     Informed Consent- Anesthetic plan and risks discussed with patient  I personally reviewed this patient with the CRNA  Discussed and agreed on the Anesthesia Plan with the CRNA  Gavin Cedeno           No results found for: HGBA1C    Lab Results   Component Value Date     (L) 01/23/2014    K 3 7 01/23/2014    CL 98 (L) 01/23/2014    CO2 27 01/23/2014    ANIONGAP 10 01/23/2014    BUN 8 01/23/2014    CREATININE 0 74 01/23/2014    GLUCOSE 86 01/23/2014    CALCIUM 9 2 01/23/2014    AST 14 01/23/2014    ALT 19 01/23/2014    ALKPHOS 77 01/23/2014    PROT 7 5 01/23/2014    BILITOT 0 28 01/23/2014       Lab Results   Component Value Date    WBC 9 15 08/28/2017    HGB 13 7 08/28/2017    HCT 41 1 08/28/2017    MCV 96 08/28/2017     08/28/2017

## 2021-05-14 NOTE — H&P
History and Physical -  [facility]  Mohitneil Seals 28 y o  female MRN: [id number]        HPI:  27-year-old female with history of GERD was noted to be anemic with iron deficiency  Reports having some dyspeptic symptoms at times    Historical Information   Past Medical History:   Diagnosis Date    Anemia     Asthma     Blood type A+     Breast lump     last assessed: 11/20/2013    GERD (gastroesophageal reflux disease)     Internal hemorrhoid     Low iron     Lumbar herniated disc     Thoracic outlet syndrome      Past Surgical History:   Procedure Laterality Date    COLONOSCOPY      DENTAL SURGERY      implants     TOOTH EXTRACTION      WISDOM TOOTH EXTRACTION       Social History   Social History     Substance and Sexual Activity   Alcohol Use Yes    Alcohol/week: 1 0 - 2 0 standard drinks    Types: 1 - 2 Glasses of wine per week     Social History     Substance and Sexual Activity   Drug Use Never     Social History     Tobacco Use   Smoking Status Never Smoker   Smokeless Tobacco Never Used     Family History   Problem Relation Age of Onset    Thyroid disease Mother     Other Mother         Tubes in ears    Stroke Father     Other Father         Bee sting allergy    Hyperlipidemia Father     Breast cancer Paternal Grandmother     Diabetes Paternal Grandmother     Breast cancer Family         acinar cell    Diabetes Family         mellitus    Thyroid disease Family         thyroid disorder    No Known Problems Brother        Meds/Allergies     (Not in a hospital admission)      Allergies   Allergen Reactions    Naproxen Anaphylaxis, Throat Swelling and Other (See Comments)     ALEVE - ANAPHYLAXIS  Reaction Date: 13Jun2011  TOLERATES IBUPROFEN  ALEVE - ANAPHYLAXIS  ALEVE - ANAPHYLAXIS  Reaction Date: 13Jun2011    TOLERATES IBUPROFEN    Cat Hair Extract     Dog Epithelium     Dog Epithelium Allergy Skin Test     Pollen Extract     Ragwitek [Short Ragweed Pollen Ext]     Lyrica [Pregabalin] Rash    Nitrofurantoin Rash     Reaction Date: [id number]; Macrobid       Objective     Blood pressure 139/85, pulse 85, temperature (!) 97 2 °F (36 2 °C), temperature source Temporal, resp  rate 14, height 5' 7" (1 702 m), weight 77 1 kg (170 lb), SpO2 100 %      PHYSICAL EXAM:    Gen: NAD  CV: S1 & S2 normal, RRR  CHEST: Clear to auscultate  ABD: soft, NT/ND, good bowel sounds  EXT: no edema    ASSESSMENT:     Iron deficiency anemia, dyspepsia    PLAN:    EGD and colonoscopy

## 2021-05-14 NOTE — ANESTHESIA POSTPROCEDURE EVALUATION
Post-Op Assessment Note    CV Status:  Stable  Pain Score: 0    Pain management: adequate     Mental Status:  Alert and awake   Hydration Status:  Euvolemic   PONV Controlled:  Controlled   Airway Patency:  Patent      Post Op Vitals Reviewed: Yes      Staff: CRNA         No complications documented      BP   98/55   Temp 97 6 °F (36 4 °C) (05/14/21 0856)    Pulse 74 (05/14/21 0856)   Resp 16 (05/14/21 0856)    SpO2 99 % (05/14/21 0856)

## 2021-05-17 DIAGNOSIS — D64.9 ANEMIA, UNSPECIFIED TYPE: Primary | ICD-10-CM

## 2021-05-17 RX ORDER — SODIUM, POTASSIUM,MAG SULFATES 17.5-3.13G
2 SOLUTION, RECONSTITUTED, ORAL ORAL SEE ADMIN INSTRUCTIONS
Qty: 354 ML | Refills: 0 | Status: SHIPPED | OUTPATIENT
Start: 2021-05-17 | End: 2021-08-18

## 2021-05-24 ENCOUNTER — TELEPHONE (OUTPATIENT)
Dept: GASTROENTEROLOGY | Facility: CLINIC | Age: 36
End: 2021-05-24

## 2021-05-24 DIAGNOSIS — K62.89 RECTAL MASS: Primary | ICD-10-CM

## 2021-05-25 ENCOUNTER — HOSPITAL ENCOUNTER (OUTPATIENT)
Dept: RADIOLOGY | Age: 36
Discharge: HOME | End: 2021-05-25
Payer: COMMERCIAL

## 2021-05-25 DIAGNOSIS — K62.89 RECTAL MASS: ICD-10-CM

## 2021-05-25 PROCEDURE — G1004 CDSM NDSC: HCPCS

## 2021-05-25 PROCEDURE — 74177 CT ABD & PELVIS W/CONTRAST: CPT

## 2021-05-25 RX ADMIN — IOHEXOL 100 ML: 350 INJECTION, SOLUTION INTRAVENOUS at 09:12

## 2021-06-02 ENCOUNTER — TELEPHONE (OUTPATIENT)
Dept: OBGYN CLINIC | Facility: CLINIC | Age: 36
End: 2021-06-02

## 2021-06-02 DIAGNOSIS — N80.9 ENDOMETRIOSIS: Primary | ICD-10-CM

## 2021-06-02 NOTE — TELEPHONE ENCOUNTER
Called pt, notified of results and ultrasound order  I put in the order  Pt advised to go for this   ----- Message from Mariel Person MD sent at 6/2/2021  6:46 AM EDT -----  Please tell patient I received these results from GI  I want her to go for a pelvic sono  That mass is most likely endometriosis

## 2021-06-08 ENCOUNTER — OFFICE VISIT (OUTPATIENT)
Dept: OBGYN CLINIC | Facility: CLINIC | Age: 36
End: 2021-06-08
Payer: COMMERCIAL

## 2021-06-08 ENCOUNTER — HOSPITAL ENCOUNTER (OUTPATIENT)
Dept: RADIOLOGY | Age: 36
Discharge: HOME | End: 2021-06-08
Payer: COMMERCIAL

## 2021-06-08 VITALS — DIASTOLIC BLOOD PRESSURE: 82 MMHG | BODY MASS INDEX: 28.04 KG/M2 | SYSTOLIC BLOOD PRESSURE: 138 MMHG | WEIGHT: 179 LBS

## 2021-06-08 DIAGNOSIS — N80.9 ENDOMETRIOSIS: ICD-10-CM

## 2021-06-08 DIAGNOSIS — N80.4 ENDOMETRIOSIS OF RECTOVAGINAL SEPTUM: Primary | ICD-10-CM

## 2021-06-08 PROCEDURE — 76856 US EXAM PELVIC COMPLETE: CPT

## 2021-06-08 PROCEDURE — 99214 OFFICE O/P EST MOD 30 MIN: CPT | Performed by: OBSTETRICS & GYNECOLOGY

## 2021-06-08 PROCEDURE — 76830 TRANSVAGINAL US NON-OB: CPT

## 2021-06-09 NOTE — PROGRESS NOTES
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    Plan: We had a long discussion regarding all findings  Given location and symptoms associated with area of posterior uterus as well as desire for future childbearing I recommended she pursue consult and possible surgery with GYN ONC  Location of disease requires robotic approach and is at increased risk of injury to ureters and rectum  Discussed plan for Orlissa or Lupron post-resection to keep disease under control during recovery phase  HPI:  Maria Luisa Nuñez is a 28 y o  female who presents with recent abnormal findings on c-scope/CT scan/sono  She has been experiencing significant pelvic pain and dysmenorrhea since 2017  Pain has improved but menses now irregular and heavy  She has also experienced difficulty and pain with bowel movements  Laparoscopy had been discussed in the past but patient was hesitant to pursue due to concern for organ injury and lack of findings on previous evaluations        Historical Information   Past Medical History:   Diagnosis Date    Anemia     Asthma     Blood type A+     Breast lump     last assessed: 11/20/2013    GERD (gastroesophageal reflux disease)     Internal hemorrhoid     Low iron     Lumbar herniated disc     Thoracic outlet syndrome      Past Surgical History:   Procedure Laterality Date    COLONOSCOPY      DENTAL SURGERY      implants     TOOTH EXTRACTION      WISDOM TOOTH EXTRACTION       OB/GYN History:   Nulligravida  No h/o STD's  No cervical dysplasia    Family History   Problem Relation Age of Onset    Thyroid disease Mother     Other Mother         Tubes in ears    Stroke Father     Other Father         Bee sting allergy    Hyperlipidemia Father     Breast cancer Paternal Grandmother     Diabetes Paternal [de-identified]     Breast cancer Family         acinar cell    Diabetes Family mellitus    Thyroid disease Family         thyroid disorder    No Known Problems Brother      Social History   Social History     Substance and Sexual Activity   Alcohol Use Yes    Alcohol/week: 1 0 - 2 0 standard drinks    Types: 1 - 2 Glasses of wine per week     Social History     Substance and Sexual Activity   Drug Use Never     Social History     Tobacco Use   Smoking Status Never Smoker   Smokeless Tobacco Never Used     E-Cigarette/Vaping    E-Cigarette Use Never User      E-Cigarette/Vaping Substances    Nicotine No     THC No     CBD No     Flavoring No     Other No     Unknown No        Meds/Allergies   Current Outpatient Medications on File Prior to Visit   Medication Sig    albuterol (PROVENTIL HFA,VENTOLIN HFA) 90 mcg/act inhaler Inhale 2 puffs every 6 (six) hours as needed   [employer], Misc  (PROMISEB) CREA TO FACE 2 TIMES A DAY AS NEEDED    azelastine (ASTELIN) 0 1 % nasal spray 1 spray into each nostril 2 (two) times a day Use in each nostril as directed    Biotin 10 MG CAPS Take by mouth    BLACK ELDERBERRY PO Take by mouth    Cetirizine HCl (ZYRTEC ALLERGY) 10 MG CAPS Take by mouth    Dapsone (ACZONE) 7 5 % GEL PEA SIZE TO FACE , NECK AND CHEST EVERY NIGHT AT BEDTIME    ferrous sulfate 325 (65 Fe) mg tablet Take 325 mg by mouth daily with breakfast    Multiple Vitamins-Minerals (WOMENS DAILY FORMULA PO) Take by mouth    Probiotic Product (PROBIOTIC DAILY PO) Take by mouth    fluticasone-umeclidinium-vilanterol (TRELEGY) 100-62 5-25 MCG/INH inhaler Inhale 1 puff daily Rinse mouth after use  (Patient not taking: Reported on 6/8/2021)    Na Sulfate-K Sulfate-Mg Sulf (Suprep Bowel Prep Kit) 17 5-3 13-1 6 GM/177ML SOLN Take 2 Bottles by mouth see administration instructions Suprep bowel prep  Please follow the instructions from the office (Patient not taking: Reported on 6/8/2021)     No current facility-administered medications on file prior to visit  Allergies   Allergen Reactions    Naproxen Anaphylaxis, Throat Swelling and Other (See Comments)     ALEVE - ANAPHYLAXIS  Reaction Date: 13Jun2011  TOLERATES IBUPROFEN  ALEVE - ANAPHYLAXIS  ALEVE - ANAPHYLAXIS  Reaction Date: 13Jun2011  TOLERATES IBUPROFEN    Cat Hair Extract     Dog Epithelium     Dog Epithelium Allergy Skin Test     Pollen Extract     Ragwitek [Short Ragweed Pollen Ext]     Lyrica [Pregabalin] Rash    Nitrofurantoin Rash     Reaction Date: 13Jun2011; Macrobid     Review of Systems   Constitution: Negative for chills, decreased appetite, fever and weight loss  Respiratory: Negative for cough, shortness of breath, sputum production and wheezing  Gastrointestinal: Positive for change in bowel habit and constipation  Negative for abdominal pain and hematochezia  Genitourinary: Positive for menorrhagia and pelvic pain  Negative for flank pain and hematuria  Objective   Vitals: Blood pressure 138/82, weight 81 2 kg (179 lb), last menstrual period 04/25/2021  Physical Exam  Constitutional:       Appearance: Normal appearance  HENT:      Head: Normocephalic  Cardiovascular:      Rate and Rhythm: Normal rate and regular rhythm  Pulmonary:      Effort: Pulmonary effort is normal    Musculoskeletal:         General: No swelling  Neurological:      General: No focal deficit present  Mental Status: She is alert and oriented to person, place, and time  Skin:     General: Skin is warm and dry  Psychiatric:         Mood and Affect: Mood normal          Behavior: Behavior normal    Vitals signs reviewed  Pelvic Sono 6/8/2021:    ENDOMETRIUM:    Normal caliber of 10 mm  Heterogeneous   OVARIES/ADNEXA:  Right ovary:  3 x 2 7 x 2 5 cm  No suspicious right ovarian abnormality  Within the right ovary is a complex cyst with low-level echoes adherent to one wall of the cyst measuring 2 1 x 1 5 x 1 6 without significant peripheral vascularity    This could represent a degenerating corpus   luteum or endometrioma given the history  This correlates to the CT finding extending towards the rectal region  Doppler flow within normal limits    Left ovary:  3 7 x 3 x 2 7 cm  No suspicious left ovarian abnormality  A thick-walled complex cyst is seen measuring 1 7 x 1 8 x 2 cm also without peripheral vascularity  A 2nd complex cyst measures 1 9 x 1 4 x 1 5 cm  Doppler flow within normal limits    Near the midline posterior to the lower uterine segment is a 4 5 x 1 7 x 2 8 cm complex cystic-appearing lesion with a thick wall correlating to the abnormality on CT  This is adherent to the serosal surface of the uterus as noted on compression   volumetric cine views  This may be related to an endometrioma or other lesion    There is no free fluid    IMPRESSION:   4 5 cm lesion adheres to the uterus in the midline and correlates with the CT finding    Complex cysts in both ovaries have the appearance of hemorrhage with low-level echoes    These findings could be related to endometriosis although other peritoneal processes are possible  CT Abdomen/Pelvis 5/25/2021:    FINDINGS:   ABDOMEN  LOWER CHEST:  No clinically significant abnormality identified in the visualized lower chest   LIVER/BILIARY TREE:  Unremarkable    GALLBLADDER:  No calcified gallstones  No pericholecystic inflammatory change    SPLEEN:  Unremarkable    PANCREAS:  Unremarkable    ADRENAL GLANDS:  Unremarkable    KIDNEYS/URETERS:  Unremarkable  No hydronephrosis    STOMACH AND BOWEL:  Unremarkable    APPENDIX:  No findings to suggest appendicitis    ABDOMINOPELVIC CAVITY:  No ascites  No pneumoperitoneum    No lymphadenopathy    VESSELS:  Unremarkable for patient's age    PELVIS   REPRODUCTIVE ORGANS:  Bulbous appearance of the cervix with ill-defined appearance of the posterior cervix with soft tissue density abutting the rectum effacing the rectal uterine pouch    URINARY BLADDER: Unremarkable    ABDOMINAL WALL/INGUINAL REGIONS:  Unremarkable    OSSEOUS STRUCTURES:  No acute fracture or destructive osseous lesion    IMPRESSION:   Bulbous appearance of the cervix with ill-defined margin of the posterior cervix and soft tissue density abutting the rectum effacing the rectouterine pouch  Differential diagnosis includes underlying mass  Recommend evaluation with pelvic ultrasound  Colonoscopy 4/1/2021:    FINDINGS:  · The cecum appeared normal   · All observed locations appeared normal, including the ascending colon, hepatic flexure, transverse colon, splenic flexure, descending colon and sigmoid colon    · Mid rectum- large area of abnormal mucosa semi circumferentially with edematous mucosa and villous appearing mucosa in the center status post multiple biopsies

## 2021-06-10 ENCOUNTER — TELEPHONE (OUTPATIENT)
Dept: SURGICAL ONCOLOGY | Facility: CLINIC | Age: 36
End: 2021-06-10

## 2021-06-10 NOTE — TELEPHONE ENCOUNTER
New Patient Encounter    New Patient Intake Form   Patient Details:  Anuja Hu  1985  [id number]    Background Information:  Nino starts by opening a telephone encounter and gathering the following information   Who is calling to schedule? If not self, relationship to patient? Patient   Referring Provider Lakisha   What is the diagnosis? Endometriosis of rectovaginal septum   Is this Cancer or Non-Cancer? Non-Cancer   Is this diagnosis confirmed? Yes   When was the diagnosis? 6/10   Is there a confirmed diagnosis from a biopsy/tissue reviewed by pathology? Yes   Were outside slides requested? No   Is patient aware of diagnosis? Yes   Is there a personal history and what kind? No   Is there a family history and what kind? No   Reason for visit? New Diagnosis   Have you had any imaging or labs done? If so: when, where? yes  [facility]   Are records in [facility]? yes   If patient has a prior history of cancer were old records obtained? NA   Was the patient told to bring a disk? No   Does the patient smoke or Vape? No   If yes, how many packs or cartridges per day? na   Scheduling Information:   [facility]: [place] [facility] [place]     Are there any dates/time the patient cannot be seen? na   Miscellaneous: na   After completing the above information, please route to Financial Counselor and the appropriate Nurse Navigator for review

## 2021-07-10 NOTE — PROGRESS NOTES
Assessment/Plan:    Problem List Items Addressed This Visit        Musculoskeletal and Integument    Endometriosis of rectovaginal septum     29yo with known endometriosis presents for consultation  Pt had work up with colonoscopy/ultrasound/CT scan showing recto-vaginal septum endometriotic implants  Have personally reviewed the CT and pelvic ultrasound images  There is some heterogeneous thickening of the lower uterine segment that is difficult to assess involvement of surrounding tissue  Next discussed with patient utilizing an MRI to further delineate fat planes and ability to dissect this area  I have discussed with patient the usual management of worsening endometriosis including hysterectomy with bilateral salpingo oophorectomy for ovarian/hormonal suppression  This will be done in someone who done childbearing with continued severe pelvic pain  She is not interested in this extent of resection  She states that her pain is manageable but persistent  She strongly desires childbearing  I have discussed the difficult area for dissection that may be unable to be resected without hysterectomy  I further discussed that this area will most likely require a bowel resection with probable anastomosis versus permanent colostomy should be to inferior  Patient is with significant symptoms and therefore it is reasonable to attempt resecting this area  She does understand that if it is unable to be resected robotically that she would require exploratory laparotomy with probable bowel resection  We further discussed the use of an MRI prior to decision making to help in surgical planning  Pt would like to obtain MRI first and f/u in office regarding possible surgery  We further discussed the option of biopsy to ensure endometriosis vs malignancy if she defers surgical intervention       Will schedule MRI and return to discuss              Relevant Orders    MRI pelvis female wo and w contrast Genitourinary    Dysmenorrhea - Primary       Other    Irregular menses    Change in bowel habits     Colonoscopy with extrinsic compression/edema noted              I have spent 60 minutes with Patient and family today in which greater than 50% of this time was spent in counseling/coordination of care regarding Diagnostic results, Risks and benefits of tx options and Impressions differential diagnosis, treatment options  CHIEF COMPLAINT: endometriosis, pelvic mass     Oncology Problem:  Cancer Staging  No matching staging information was found for the patient  Previous therapy:  Oncology History    No history exists  Most recent imaging:  US pelvis complete w transvaginal  Narrative: PELVIC ULTRASOUND, COMPLETE    INDICATION:  28years old  N80 9: Endometriosis, unspecified  COMPARISON: 8/25/2017; CT from 5/25/2021    TECHNIQUE:   Transabdominal pelvic ultrasound was performed in sagittal and transverse planes with a curvilinear transducer  Additional transvaginal imaging was performed to better evaluate the endometrium and ovaries  Imaging included volumetric   sweeps as well as traditional still imaging technique  FINDINGS:    UTERUS:  The uterus is anteverted in position, measuring 7 2 x 3 7 x 4 4 cm  Contour and echotexture appear normal    Small subserosal nodule noted anteriorly measures up to 8 mm in size possibly a fibroid  2nd nodule measures 1 cm in the posterior body in intramural location also most likely a small fibroid  The cervix shows no suspicious abnormality  ENDOMETRIUM:    Normal caliber of 10 mm  Heterogeneous    OVARIES/ADNEXA:  Right ovary:  3 x 2 7 x 2 5 cm  No suspicious right ovarian abnormality  Within the right ovary is a complex cyst with low-level echoes adherent to one wall of the cyst measuring 2 1 x 1 5 x 1 6 without significant peripheral vascularity  This could represent a degenerating corpus   luteum or endometrioma given the history  This correlates to the CT finding extending towards the rectal region  Doppler flow within normal limits  Left ovary:  3 7 x 3 x 2 7 cm  No suspicious left ovarian abnormality  A thick-walled complex cyst is seen measuring 1 7 x 1 8 x 2 cm also without peripheral vascularity  A 2nd complex cyst measures 1 9 x 1 4 x 1 5 cm  Doppler flow within normal limits  Near the midline posterior to the lower uterine segment is a 4 5 x 1 7 x 2 8 cm complex cystic-appearing lesion with a thick wall correlating to the abnormality on CT  This is adherent to the serosal surface of the uterus as noted on compression   volumetric cine views  This may be related to an endometrioma or other lesion  There is no free fluid  Impression:    4 5 cm lesion adheres to the uterus in the midline and correlates with the CT finding  Complex cysts in both ovaries have the appearance of hemorrhage with low-level echoes  These findings could be related to endometriosis although other peritoneal processes are possible  Pelvic MRI with and without contrast is advised for further assessment  Some thinner sections through the lower uterine segment/rectal region at the time of scanning would be helpful  The study was marked in EPIC for significant notification  Workstation performed: [id number]        Patient ID: Analy Riley is a 39 y o  female  29yo with known endometriosis presents for consultation  Pt had work up with colonoscopy/ultrasound/CT scan showing recto-vaginal septum endometriotic implants  She has significant pelvic pain and dysmenorrhea as well as pain with BMs  Patient had been seen by a her PCP and was noted to be anemic  Underlying etiology was unclear and colonoscopy was performed    Colonoscopy showed: Mid rectum- large area of abnormal mucosa semi circumferentially with edematous mucosa and villous appearing mucosa in the center status post multiple biopsies   Biopsies were all benign  Given this abnormality imaging was obtained  CT showed: IMPRESSION:   Bulbous appearance of the cervix with ill-defined margin of the posterior cervix and soft tissue density abutting the rectum effacing the rectouterine pouch  Differential diagnosis includes underlying mass  Recommend evaluation with pelvic ultrasound  Pelvic US showed:  Near the midline posterior to the lower uterine segment is a 4 5 x 1 7 x 2 8 cm complex cystic-appearing lesion with a thick wall correlating to the abnormality on CT  This is adherent to the serosal surface of the uterus as noted on compression   volumetric cine views  This may be related to an endometrioma or other lesion  In 2017 patient also reported rectal bleeding for which colonoscopy was performed and showed hemorrhoids  She has a longstanding history of menorrhagia and dysmenorrhea dating back to age 12  She had been on OCPs for a prolonged period of time with some relief  She is strongly interested in childbearing but is not actively trying at this time  She reports continued pelvic pain with menses as well as abdominal bloating, changes in bowel movements, pain with defecation, and heavy bleeding  Review of Systems   Constitutional: Negative for appetite change, chills, fatigue and fever  Respiratory: Negative for chest tightness and shortness of breath  Cardiovascular: Negative for chest pain  Gastrointestinal: Positive for abdominal distention, blood in stool and rectal pain  Negative for abdominal pain, constipation, diarrhea and nausea  Genitourinary: Positive for menstrual problem, pelvic pain and vaginal bleeding  Negative for difficulty urinating, flank pain, frequency, urgency, vaginal discharge and vaginal pain  Musculoskeletal: Negative for back pain, joint swelling and myalgias  Skin: Negative for rash  Neurological: Negative for dizziness, light-headedness, numbness and headaches     Hematological: Negative for adenopathy  Psychiatric/Behavioral: The patient is not nervous/anxious  Current Outpatient Medications   Medication Sig Dispense Refill    albuterol (PROVENTIL HFA,VENTOLIN HFA) 90 mcg/act inhaler Inhale 2 puffs every 6 (six) hours as needed      azelastine (ASTELIN) 0 1 % nasal spray 1 spray into each nostril 2 (two) times a day Use in each nostril as directed 1 Bottle 12    Biotin 10 MG CAPS Take by mouth      Cetirizine HCl (ZYRTEC ALLERGY) 10 MG CAPS Take by mouth      ferrous sulfate 325 (65 Fe) mg tablet Take 325 mg by mouth 3 (three) times a week       fluticasone-umeclidinium-vilanterol (TRELEGY) 100-62 5-25 MCG/INH inhaler Inhale 1 puff daily Rinse mouth after use  1 Inhaler 12    Multiple Vitamins-Minerals (WOMENS DAILY FORMULA PO) Take by mouth      Probiotic Product (PROBIOTIC DAILY PO) Take by mouth      Antiseborrheic Products, Misc  (PROMISEB) CREA TO FACE 2 TIMES A DAY AS NEEDED (Patient not taking: Reported on 7/13/2021)      BLACK ELDERBERRY PO Take by mouth      Dapsone (ACZONE) 7 5 % GEL PEA SIZE TO FACE , NECK AND CHEST EVERY NIGHT AT BEDTIME      Na Sulfate-K Sulfate-Mg Sulf (Suprep Bowel Prep Kit) 17 5-3 13-1 6 GM/177ML SOLN Take 2 Bottles by mouth see administration instructions Suprep bowel prep  Please follow the instructions from the office (Patient not taking: Reported on 6/8/2021) 354 mL 0     No current facility-administered medications for this visit  Allergies   Allergen Reactions    Naproxen Anaphylaxis, Throat Swelling and Other (See Comments)     ALEVE - ANAPHYLAXIS  Reaction Date: 13Jun2011  TOLERATES IBUPROFEN  ALEVE - ANAPHYLAXIS  ALEVE - ANAPHYLAXIS  Reaction Date: 13Jun2011    TOLERATES IBUPROFEN    Cat Hair Extract     Dandelion Itching and Cough    Dog Epithelium     Dog Epithelium Allergy Skin Test     Pollen Extract     Ragwitek [Short Ragweed Pollen Ext]     Lyrica [Pregabalin] Rash    Nitrofurantoin Rash     Reaction Date: 13Jun2011; Macrobid       Past Medical History:   Diagnosis Date    Anemia     Asthma     Blood type A+     Breast lump     last assessed: 2013    GERD (gastroesophageal reflux disease)     Internal hemorrhoid     Low iron     Lumbar herniated disc     Thoracic outlet syndrome        Past Surgical History:   Procedure Laterality Date    COLONOSCOPY      DENTAL SURGERY      implants     TOOTH EXTRACTION      WISDOM TOOTH EXTRACTION         OB History        0    Para   0    Term   0       0    AB   0    Living   0       SAB   0    TAB   0    Ectopic   0    Multiple   0    Live Births   0                 Family History   Problem Relation Age of Onset    Thyroid disease Mother     Other Mother         Tubes in ears    Stroke Father     Other Father         Bee sting allergy    Hyperlipidemia Father     Breast cancer Paternal Grandmother     Diabetes Paternal Grandmother     Breast cancer Family         acinar cell    Diabetes Family         mellitus    Thyroid disease Family         thyroid disorder    No Known Problems Brother        The following portions of the patient's history were reviewed and updated as appropriate: allergies, current medications, past family history, past medical history, past social history, past surgical history and problem list       Objective:    Blood pressure 110/70, pulse 73, temperature 98 °F (36 7 °C), temperature source Temporal, height 5' 7" (1 702 m), weight 79 8 kg (176 lb), SpO2 99 %  Body mass index is 27 57 kg/m²  Physical Exam  HENT:      Head: Normocephalic and atraumatic  Cardiovascular:      Rate and Rhythm: Normal rate and regular rhythm  Pulmonary:      Effort: Pulmonary effort is normal    Abdominal:      General: There is no distension  Palpations: Abdomen is soft  There is no mass  Genitourinary:     Comments:  The external female genitalia is normal  The bartholin's, uretheral and skenes glands are normal  The urethral meatus is normal (midline with no lesions)  Anus without fissure or lesion  Speculum exam reveals vagina without lesion or discharge  Cervix is normal appearing without visible lesions, +ectropion that was friable  No significant cystocele or rectocele noted  Bimanual exam notes a uterus with normal contour, mobility  + tenderness posteriorly  Recto-vaginal exam with tenderness and some thickening noted rectally    Musculoskeletal:         General: Normal range of motion  Cervical back: Normal range of motion  Skin:     General: Skin is warm and dry  Neurological:      Mental Status: She is alert and oriented to person, place, and time             No results found for:   Lab Results   Component Value Date    WBC 9 15 08/28/2017    HGB 13 7 08/28/2017    HCT 41 1 08/28/2017    MCV 96 08/28/2017     08/28/2017     Lab Results   Component Value Date     (L) 01/23/2014    K 3 7 01/23/2014    CL 98 (L) 01/23/2014    CO2 27 01/23/2014    ANIONGAP 10 01/23/2014    BUN 8 01/23/2014    CREATININE 0 74 01/23/2014    GLUCOSE 86 01/23/2014    CALCIUM 9 2 01/23/2014    AST 14 01/23/2014    ALT 19 01/23/2014    ALKPHOS 77 01/23/2014    PROT 7 5 01/23/2014    BILITOT 0 28 01/23/2014        Trend:  No results found for:

## 2021-07-13 ENCOUNTER — CONSULT (OUTPATIENT)
Dept: GYNECOLOGIC ONCOLOGY | Facility: CLINIC | Age: 36
End: 2021-07-13
Payer: COMMERCIAL

## 2021-07-13 VITALS
TEMPERATURE: 98 F | BODY MASS INDEX: 27.62 KG/M2 | SYSTOLIC BLOOD PRESSURE: 110 MMHG | HEART RATE: 73 BPM | DIASTOLIC BLOOD PRESSURE: 70 MMHG | OXYGEN SATURATION: 99 % | WEIGHT: 176 LBS | HEIGHT: 67 IN

## 2021-07-13 DIAGNOSIS — N94.6 DYSMENORRHEA: Primary | ICD-10-CM

## 2021-07-13 DIAGNOSIS — R19.4 CHANGE IN BOWEL HABITS: ICD-10-CM

## 2021-07-13 DIAGNOSIS — N92.6 IRREGULAR MENSES: ICD-10-CM

## 2021-07-13 DIAGNOSIS — N80.4 ENDOMETRIOSIS OF RECTOVAGINAL SEPTUM: ICD-10-CM

## 2021-07-13 PROBLEM — N80.40: Status: ACTIVE | Noted: 2021-07-13

## 2021-07-13 PROCEDURE — 99244 OFF/OP CNSLTJ NEW/EST MOD 40: CPT | Performed by: OBSTETRICS & GYNECOLOGY

## 2021-07-13 NOTE — ASSESSMENT & PLAN NOTE
29yo with known endometriosis presents for consultation  Pt had work up with colonoscopy/ultrasound/CT scan showing recto-vaginal septum endometriotic implants  Have personally reviewed the CT and pelvic ultrasound images  There is some heterogeneous thickening of the lower uterine segment that is difficult to assess involvement of surrounding tissue  Next discussed with patient utilizing an MRI to further delineate fat planes and ability to dissect this area  I have discussed with patient the usual management of worsening endometriosis including hysterectomy with bilateral salpingo oophorectomy for ovarian/hormonal suppression  This will be done in someone who done childbearing with continued severe pelvic pain  She is not interested in this extent of resection  She states that her pain is manageable but persistent  She strongly desires childbearing  I have discussed the difficult area for dissection that may be unable to be resected without hysterectomy  I further discussed that this area will most likely require a bowel resection with probable anastomosis versus permanent colostomy should be to inferior  Patient is with significant symptoms and therefore it is reasonable to attempt resecting this area  She does understand that if it is unable to be resected robotically that she would require exploratory laparotomy with probable bowel resection  We further discussed the use of an MRI prior to decision making to help in surgical planning  Pt would like to obtain MRI first and f/u in office regarding possible surgery  We further discussed the option of biopsy to ensure endometriosis vs malignancy if she defers surgical intervention       Will schedule MRI and return to discuss

## 2021-07-21 ENCOUNTER — HOSPITAL ENCOUNTER (OUTPATIENT)
Dept: RADIOLOGY | Facility: HOSPITAL | Age: 36
Discharge: HOME | End: 2021-07-21
Attending: OBSTETRICS & GYNECOLOGY
Payer: COMMERCIAL

## 2021-07-21 DIAGNOSIS — N80.4 ENDOMETRIOSIS OF RECTOVAGINAL SEPTUM: ICD-10-CM

## 2021-07-21 PROCEDURE — 72197 MRI PELVIS W/O & W/DYE: CPT

## 2021-07-21 PROCEDURE — G1004 CDSM NDSC: HCPCS

## 2021-07-21 PROCEDURE — A9585 GADOBUTROL INJECTION: HCPCS | Performed by: OBSTETRICS & GYNECOLOGY

## 2021-07-21 RX ADMIN — GADOBUTROL 8 ML: 604.72 INJECTION INTRAVENOUS at 21:41

## 2021-07-26 ENCOUNTER — TELEPHONE (OUTPATIENT)
Dept: GYNECOLOGIC ONCOLOGY | Facility: CLINIC | Age: 36
End: 2021-07-26

## 2021-07-26 NOTE — TELEPHONE ENCOUNTER
Patient canceled appointment for 7/28 with Dr Antelmo Horan   I called and left her a message to call the office to reschedule appointment

## 2021-07-27 ENCOUNTER — TELEPHONE (OUTPATIENT)
Dept: HEMATOLOGY ONCOLOGY | Facility: CLINIC | Age: 36
End: 2021-07-27

## 2021-07-27 NOTE — TELEPHONE ENCOUNTER
Scheduling Appointment     Who Is Calling to Schedule Patient    Doctor Dr Christofer Kelly   [facility]   Date and Time 08/10 at 11:00am         Patient verbalized understanding    yes

## 2021-08-10 ENCOUNTER — OFFICE VISIT (OUTPATIENT)
Dept: GYNECOLOGIC ONCOLOGY | Facility: CLINIC | Age: 36
End: 2021-08-10
Payer: COMMERCIAL

## 2021-08-10 VITALS
DIASTOLIC BLOOD PRESSURE: 70 MMHG | BODY MASS INDEX: 26.29 KG/M2 | WEIGHT: 167.5 LBS | SYSTOLIC BLOOD PRESSURE: 110 MMHG | HEART RATE: 67 BPM | RESPIRATION RATE: 18 BRPM | HEIGHT: 67 IN | TEMPERATURE: 98.2 F

## 2021-08-10 DIAGNOSIS — N80.4 ENDOMETRIOSIS OF RECTOVAGINAL SEPTUM: Primary | ICD-10-CM

## 2021-08-10 PROCEDURE — 99213 OFFICE O/P EST LOW 20 MIN: CPT | Performed by: OBSTETRICS & GYNECOLOGY

## 2021-08-11 NOTE — PROGRESS NOTES
Assessment/Plan:    Problem List Items Addressed This Visit        Musculoskeletal and Integument    Endometriosis of rectovaginal septum - Primary     29yo with known endometriosis presents for follow up after MRI completion  MRI images reviewed which show extensive endometriosis within the pelvis with adherence of the ovaries to the uterus and rectal involvement  I have discussed with the patient today that it is unlikely that endometriosis can be removed without compromise of her uterus and rectum  Given her current plans of childbearing I also feel that it will be very difficult for her to conceive naturally  I have recommended that she meet with a reproductive endocrinologist for further treatment options prior to surgical intervention  I suggested attempting use of Lupron to decrease the burden of endometriosis prior to attempt to consumption versus surgical intervention  Patient is hesitant to undergo this secondary to side effects  I have discussed that left untreated the endometriosis can continue to grow and require more extensive surgical intervention in the future as well as the possibility of being unable to reanastomose the colon  I am hopeful at this point in time given the MRI findings that we would be able to resected and reanastomosed:, however this could change in the future  Patient would like to continue her discussion with her  as well as GUERLINE and has 2nd opinion set up with endometriosis experts  We have discussed returning for further discussion of surgical intervention when she feels it is appropriate  All questions were answered and pt will f/u if needed                  CHIEF COMPLAINT: follow up after MRI      Problem:  Cancer Staging  No matching staging information was found for the patient  Previous therapy:  Oncology History    No history exists           Patient ID: Korina Isbell is a 39 y o  female  29yo with known endometriosis presents for follow up after MRI completion  Pt reports same symptoms as previously with pelvic pain with menses and bloating  +pain with BMs  +menorrhagia  Pt has second opinions set up with endometriosis experts in the near future as well  History:  Pt had work up with colonoscopy/ultrasound/CT scan showing recto-vaginal septum endometriotic implants  She has significant pelvic pain and dysmenorrhea as well as pain with BMs  Patient had been seen by a her PCP and was noted to be anemic  Underlying etiology was unclear and colonoscopy was performed  Colonoscopy showed: Mid rectum- large area of abnormal mucosa semi circumferentially with edematous mucosa and villous appearing mucosa in the center status post multiple biopsies   Biopsies were all benign  Given this abnormality imaging was obtained  CT showed: IMPRESSION:   Bulbous appearance of the cervix with ill-defined margin of the posterior cervix and soft tissue density abutting the rectum effacing the rectouterine pouch  Differential diagnosis includes underlying mass  Recommend evaluation with pelvic ultrasound  Pelvic US showed:  Near the midline posterior to the lower uterine segment is a 4 5 x 1 7 x 2 8 cm complex cystic-appearing lesion with a thick wall correlating to the abnormality on CT  This is adherent to the serosal surface of the uterus as noted on compression   volumetric cine views  This may be related to an endometrioma or other lesion  The following portions of the patient's history were reviewed and updated as appropriate: allergies, current medications, past family history, past medical history, past social history, past surgical history and problem list     Review of Systems   Constitutional: Negative for appetite change, chills, fatigue and fever  Respiratory: Negative for chest tightness and shortness of breath  Gastrointestinal: Positive for anal bleeding   Negative for abdominal distention, abdominal pain, constipation, diarrhea and nausea  Genitourinary: Positive for menstrual problem, pelvic pain and vaginal bleeding  Negative for difficulty urinating, flank pain, frequency, urgency, vaginal discharge and vaginal pain  Musculoskeletal: Negative for back pain, joint swelling and myalgias  Skin: Negative for rash  Neurological: Negative for dizziness, light-headedness, numbness and headaches  Current Outpatient Medications   Medication Sig Dispense Refill    albuterol (PROVENTIL HFA,VENTOLIN HFA) 90 mcg/act inhaler Inhale 2 puffs every 6 (six) hours as needed      azelastine (ASTELIN) 0 1 % nasal spray 1 spray into each nostril 2 (two) times a day Use in each nostril as directed 1 Bottle 12    Biotin 10 MG CAPS Take by mouth      Cetirizine HCl (ZYRTEC ALLERGY) 10 MG CAPS Take by mouth      ferrous sulfate 325 (65 Fe) mg tablet Take 325 mg by mouth 3 (three) times a week       fluticasone-umeclidinium-vilanterol (TRELEGY) 100-62 5-25 MCG/INH inhaler Inhale 1 puff daily Rinse mouth after use  1 Inhaler 12    Multiple Vitamins-Minerals (WOMENS DAILY FORMULA PO) Take by mouth      Probiotic Product (PROBIOTIC DAILY PO) Take by mouth      Antiseborrheic Products, Misc  (PROMISEB) CREA TO FACE 2 TIMES A DAY AS NEEDED (Patient not taking: Reported on 7/13/2021)      BLACK ELDERBERRY PO Take by mouth      Dapsone (ACZONE) 7 5 % GEL PEA SIZE TO FACE , NECK AND CHEST EVERY NIGHT AT BEDTIME      Na Sulfate-K Sulfate-Mg Sulf (Suprep Bowel Prep Kit) 17 5-3 13-1 6 GM/177ML SOLN Take 2 Bottles by mouth see administration instructions Suprep bowel prep  Please follow the instructions from the office (Patient not taking: Reported on 6/8/2021) 354 mL 0     No current facility-administered medications for this visit  Objective:    Blood pressure 110/70, pulse 67, temperature 98 2 °F (36 8 °C), resp  rate 18, height 5' 7" (1 702 m), weight 76 kg (167 lb 8 oz)  Body mass index is 26 23 kg/m²    Body surface area is 1 88 meters squared  Physical Exam  HENT:      Head: Normocephalic and atraumatic  Nose: Nose normal    Cardiovascular:      Rate and Rhythm: Normal rate and regular rhythm  Pulmonary:      Effort: Pulmonary effort is normal    Genitourinary:     Comments: defer  Musculoskeletal:         General: No swelling  Normal range of motion  Cervical back: Normal range of motion  Skin:     General: Skin is warm and dry  Neurological:      General: No focal deficit present  Mental Status: She is alert     Psychiatric:         Mood and Affect: Mood normal            Lab Results   Component Value Date     (L) 01/23/2014    K 3 7 01/23/2014    CL 98 (L) 01/23/2014    CO2 27 01/23/2014    ANIONGAP 10 01/23/2014    BUN 8 01/23/2014    CREATININE 0 74 01/23/2014    GLUCOSE 86 01/23/2014    CALCIUM 9 2 01/23/2014    AST 14 01/23/2014    ALT 19 01/23/2014    ALKPHOS 77 01/23/2014    PROT 7 5 01/23/2014    BILITOT 0 28 01/23/2014     Lab Results   Component Value Date    WBC 9 15 08/28/2017    HGB 13 7 08/28/2017    HCT 41 1 08/28/2017    MCV 96 08/28/2017     08/28/2017     Lab Results   Component Value Date    NEUTROABS 4 54 01/23/2014        Trend:  No results found for:

## 2021-08-11 NOTE — ASSESSMENT & PLAN NOTE
29yo with known endometriosis presents for follow up after MRI completion  MRI images reviewed which show extensive endometriosis within the pelvis with adherence of the ovaries to the uterus and rectal involvement  I have discussed with the patient today that it is unlikely that endometriosis can be removed without compromise of her uterus and rectum  Given her current plans of childbearing I also feel that it will be very difficult for her to conceive naturally  I have recommended that she meet with a reproductive endocrinologist for further treatment options prior to surgical intervention  I suggested attempting use of Lupron to decrease the burden of endometriosis prior to attempt to consumption versus surgical intervention  Patient is hesitant to undergo this secondary to side effects  I have discussed that left untreated the endometriosis can continue to grow and require more extensive surgical intervention in the future as well as the possibility of being unable to reanastomose the colon  I am hopeful at this point in time given the MRI findings that we would be able to resected and reanastomosed:, however this could change in the future  Patient would like to continue her discussion with her  as well as GUERLINE and has 2nd opinion set up with endometriosis experts  We have discussed returning for further discussion of surgical intervention when she feels it is appropriate      All questions were answered and pt will f/u if needed

## 2021-08-18 PROBLEM — M51.26 LUMBAR HERNIATED DISC: Status: ACTIVE | Noted: 2021-08-17

## 2021-08-18 PROBLEM — G54.0 THORACIC OUTLET SYNDROME: Status: ACTIVE | Noted: 2021-08-17

## 2021-08-18 PROBLEM — D64.9 ANEMIA: Status: ACTIVE | Noted: 2021-03-10

## 2021-08-18 PROBLEM — K21.9 GERD (GASTROESOPHAGEAL REFLUX DISEASE): Status: ACTIVE | Noted: 2021-08-17

## 2021-09-30 ENCOUNTER — TELEPHONE (OUTPATIENT)
Dept: OBGYN CLINIC | Facility: CLINIC | Age: 36
End: 2021-09-30

## 2021-09-30 NOTE — TELEPHONE ENCOUNTER
Spoke to pt and she said she was told has a polyp and dx with endometriosis  Did see GUERLINE and had some labs and HSG ( paid OOP), insurance does not cover fertility  They suggested she have an U/S done and asking if we can order it  She said they haven't been actively trying but wanted to get labs and such done to see if any issues they might have trying to conceive

## 2021-10-19 ENCOUNTER — ANNUAL EXAM (OUTPATIENT)
Dept: OBGYN CLINIC | Facility: CLINIC | Age: 36
End: 2021-10-19
Payer: COMMERCIAL

## 2021-10-19 VITALS
WEIGHT: 181.2 LBS | DIASTOLIC BLOOD PRESSURE: 60 MMHG | HEIGHT: 67 IN | SYSTOLIC BLOOD PRESSURE: 128 MMHG | BODY MASS INDEX: 28.44 KG/M2

## 2021-10-19 DIAGNOSIS — N80.4 ENDOMETRIOSIS OF RECTOVAGINAL SEPTUM: ICD-10-CM

## 2021-10-19 DIAGNOSIS — Z01.419 ENCOUNTER FOR GYNECOLOGICAL EXAMINATION (GENERAL) (ROUTINE) WITHOUT ABNORMAL FINDINGS: Primary | ICD-10-CM

## 2021-10-19 PROCEDURE — S0612 ANNUAL GYNECOLOGICAL EXAMINA: HCPCS | Performed by: OBSTETRICS & GYNECOLOGY

## 2021-10-19 RX ORDER — LEVOTHYROXINE SODIUM 0.05 MG/1
50 TABLET ORAL DAILY
COMMUNITY

## 2021-11-12 ENCOUNTER — TELEPHONE (OUTPATIENT)
Dept: GASTROENTEROLOGY | Facility: AMBULARY SURGERY CENTER | Age: 36
End: 2021-11-12

## 2022-08-18 PROBLEM — E03.9 THYROID ACTIVITY DECREASED: Status: ACTIVE | Noted: 2022-08-18

## 2023-08-17 PROBLEM — E06.3 HASHIMOTO'S DISEASE: Status: ACTIVE | Noted: 2023-08-17

## 2023-08-17 PROBLEM — E61.1 IRON DEFICIENCY: Status: ACTIVE | Noted: 2021-12-09

## 2023-09-27 ENCOUNTER — HOSPITAL ENCOUNTER (OUTPATIENT)
Dept: RADIOLOGY | Facility: HOSPITAL | Age: 38
Discharge: HOME | End: 2023-09-27
Payer: COMMERCIAL

## 2023-09-27 DIAGNOSIS — N80.9 ENDOMETRIOSIS, UNSPECIFIED: ICD-10-CM

## 2023-09-27 PROCEDURE — 72197 MRI PELVIS W/O & W/DYE: CPT

## 2023-09-27 PROCEDURE — A9585 GADOBUTROL INJECTION: HCPCS | Performed by: RADIOLOGY

## 2023-09-27 RX ORDER — GADOBUTROL 604.72 MG/ML
8 INJECTION INTRAVENOUS
Status: COMPLETED | OUTPATIENT
Start: 2023-09-27 | End: 2023-09-27

## 2023-09-27 RX ADMIN — GADOBUTROL 8 ML: 604.72 INJECTION INTRAVENOUS at 18:34

## 2024-02-21 PROBLEM — H10.13 ALLERGIC CONJUNCTIVITIS OF BOTH EYES: Status: RESOLVED | Noted: 2019-11-19 | Resolved: 2024-02-21

## 2024-02-21 PROBLEM — Z01.419 ENCOUNTER FOR GYNECOLOGICAL EXAMINATION (GENERAL) (ROUTINE) WITHOUT ABNORMAL FINDINGS: Status: RESOLVED | Noted: 2019-09-10 | Resolved: 2024-02-21

## 2024-08-09 ENCOUNTER — TELEPHONE (OUTPATIENT)
Age: 39
End: 2024-08-09
